# Patient Record
Sex: FEMALE | Race: BLACK OR AFRICAN AMERICAN | NOT HISPANIC OR LATINO | URBAN - METROPOLITAN AREA
[De-identification: names, ages, dates, MRNs, and addresses within clinical notes are randomized per-mention and may not be internally consistent; named-entity substitution may affect disease eponyms.]

---

## 2018-02-12 PROCEDURE — 88175 CYTOPATH C/V AUTO FLUID REDO: CPT | Performed by: OBSTETRICS & GYNECOLOGY

## 2018-02-12 PROCEDURE — 87624 HPV HI-RISK TYP POOLED RSLT: CPT | Performed by: OBSTETRICS & GYNECOLOGY

## 2018-06-12 PROCEDURE — 87086 URINE CULTURE/COLONY COUNT: CPT | Performed by: PHYSICIAN ASSISTANT

## 2018-08-02 NOTE — LETTER
06/17/20     To whom it may concern,     Ms. Ramos Huston was evaluated for her right leg wounds at [facility]. Part of her treatment plan is to wear compression wraps on her legs 24 hours a day/7 days a week.  She will be seen at our clinic [facility]
No

## 2018-10-04 ENCOUNTER — APPOINTMENT (RX ONLY)
Dept: URBAN - METROPOLITAN AREA CLINIC 368 | Facility: CLINIC | Age: 58
Setting detail: DERMATOLOGY
End: 2018-10-04

## 2018-10-04 DIAGNOSIS — L72.0 EPIDERMAL CYST: ICD-10-CM

## 2018-10-04 DIAGNOSIS — L98.8 OTHER SPECIFIED DISORDERS OF THE SKIN AND SUBCUTANEOUS TISSUE: ICD-10-CM

## 2018-10-04 DIAGNOSIS — L90.8 OTHER ATROPHIC DISORDERS OF SKIN: ICD-10-CM

## 2018-10-04 PROCEDURE — ? DEFER

## 2018-10-04 PROCEDURE — ? PRESCRIPTION

## 2018-10-04 PROCEDURE — ? TREATMENT REGIMEN

## 2018-10-04 PROCEDURE — 99202 OFFICE O/P NEW SF 15 MIN: CPT

## 2018-10-04 PROCEDURE — ? COUNSELING

## 2018-10-04 RX ORDER — LIDOCAINE AND PRILOCAINE 25; 25 MG/G; MG/G
CREAM TOPICAL
Qty: 1 | Refills: 1 | Status: ERX | COMMUNITY
Start: 2018-10-04

## 2018-10-04 RX ADMIN — LIDOCAINE AND PRILOCAINE: 25; 25 CREAM TOPICAL at 17:54

## 2018-10-04 NOTE — HPI: SKIN LESION
What Type Of Note Output Would You Prefer (Optional)?: Bullet Format
How Severe Is Your Skin Lesion?: moderate
Has Your Skin Lesion Been Treated?: not been treated
Is This A New Presentation, Or A Follow-Up?: Skin Lesions

## 2019-04-12 PROBLEM — I10 ESSENTIAL HYPERTENSION: Status: ACTIVE | Noted: 2019-04-12

## 2019-12-07 ENCOUNTER — APPOINTMENT (RX ONLY)
Dept: URBAN - METROPOLITAN AREA CLINIC 368 | Facility: CLINIC | Age: 59
Setting detail: DERMATOLOGY
End: 2019-12-07

## 2019-12-07 DIAGNOSIS — L98.8 OTHER SPECIFIED DISORDERS OF THE SKIN AND SUBCUTANEOUS TISSUE: ICD-10-CM

## 2019-12-07 PROCEDURE — ? FILLERS

## 2019-12-07 PROCEDURE — ? TREATMENT REGIMEN

## 2019-12-07 PROCEDURE — ? COUNSELING

## 2019-12-07 ASSESSMENT — LOCATION SIMPLE DESCRIPTION DERM
LOCATION SIMPLE: RIGHT CHEEK
LOCATION SIMPLE: LEFT CHEEK

## 2019-12-07 ASSESSMENT — LOCATION DETAILED DESCRIPTION DERM
LOCATION DETAILED: LEFT CENTRAL MALAR CHEEK
LOCATION DETAILED: RIGHT CENTRAL MALAR CHEEK

## 2019-12-07 ASSESSMENT — LOCATION ZONE DERM: LOCATION ZONE: FACE

## 2019-12-21 ENCOUNTER — APPOINTMENT (RX ONLY)
Dept: URBAN - METROPOLITAN AREA CLINIC 368 | Facility: CLINIC | Age: 59
Setting detail: DERMATOLOGY
End: 2019-12-21

## 2019-12-21 DIAGNOSIS — L98.8 OTHER SPECIFIED DISORDERS OF THE SKIN AND SUBCUTANEOUS TISSUE: ICD-10-CM

## 2019-12-21 PROCEDURE — ? FILLERS

## 2019-12-21 PROCEDURE — ? COUNSELING

## 2019-12-21 ASSESSMENT — LOCATION DETAILED DESCRIPTION DERM
LOCATION DETAILED: RIGHT NASOLABIAL FOLD
LOCATION DETAILED: LEFT NASOLABIAL FOLD
LOCATION DETAILED: LEFT MEDIAL BUCCAL CHEEK
LOCATION DETAILED: RIGHT MEDIAL BUCCAL CHEEK

## 2019-12-21 ASSESSMENT — LOCATION ZONE DERM
LOCATION ZONE: FACE
LOCATION ZONE: LIP

## 2019-12-21 ASSESSMENT — LOCATION SIMPLE DESCRIPTION DERM
LOCATION SIMPLE: LEFT CHEEK
LOCATION SIMPLE: RIGHT CHEEK
LOCATION SIMPLE: LEFT LIP
LOCATION SIMPLE: RIGHT LIP

## 2020-03-23 PROBLEM — I87.2 VENOUS STASIS DERMATITIS OF BOTH LOWER EXTREMITIES: Status: ACTIVE | Noted: 2020-03-23

## 2020-03-23 PROBLEM — I83.029 VENOUS ULCER OF LEFT LEG (HCC): Status: ACTIVE | Noted: 2020-03-23

## 2020-03-23 PROBLEM — L97.929 VENOUS ULCER OF LEFT LEG (HCC): Status: ACTIVE | Noted: 2020-03-23

## 2020-04-03 ENCOUNTER — APPOINTMENT (OUTPATIENT)
Dept: WOUND CARE | Facility: HOSPITAL | Age: 60
End: 2020-04-03
Attending: NURSE PRACTITIONER
Payer: COMMERCIAL

## 2020-04-06 ENCOUNTER — OFFICE VISIT (OUTPATIENT)
Dept: WOUND CARE | Facility: HOSPITAL | Age: 60
End: 2020-04-06
Attending: NURSE PRACTITIONER
Payer: COMMERCIAL

## 2020-04-06 DIAGNOSIS — L97.929 VENOUS ULCER OF LEFT LEG (HCC): Primary | ICD-10-CM

## 2020-04-06 DIAGNOSIS — I83.029 VENOUS ULCER OF LEFT LEG (HCC): Primary | ICD-10-CM

## 2020-04-06 PROCEDURE — 99214 OFFICE O/P EST MOD 30 MIN: CPT

## 2020-04-06 PROCEDURE — 29581 APPL MULTLAYER CMPRN SYS LEG: CPT

## 2020-04-06 NOTE — PROGRESS NOTES
Subjective    Chief Complaint  This information was obtained from the patient  The patient returns today for follow up and management of Venous Wound(s).  Venous ulcer of left leg (HCC)    Allergies  Augmentin, clindamycin, strawberries, Bactrim, PCN    HPI Constitutional Symptoms (General Health): Chills, Fatigue, Fever, Loss of Appetite, Unintentional Weight Loss  Eyes: Blurred Vision, Discharge/Drainage, Vision Changes  Ear/Nose/Mouth/Throat: Hearing Loss / Aid, Loss of Smell, Loss of Taste, Sore Throat  R Wound #2 Left, Lateral Lower Leg is a Partial Thickness Venous Ulcer and has received a status of Not Healed. Initial wound encounter measurements are 3.5cm length x 1.5cm width x 0.1cm depth, with an area of 5.25 sq cm and a volume of 0.525 cubic cm.  Ther Integumentary (Hair, Skin)  BLEs significant venous insufficiency and lipodermatosclerosis/scar tissue formation, with left lower leg wound. Neurological:  normal tone of muscle, 5/5 strength in all extremities . sensation grossly intact. Jackson Master     Psychiatri (Encounter Diagnosis) I83.029 - Varicose veins of left lower extremity with ulcer of unspecified site  (Encounter Diagnosis) L97.929 - Non-pressure chronic ulcer of unspecified part of left lower leg with unspecified severity  (Encounter Diagnosis) L03.116 Change dressing every: - week    Compression Therapy:  Avoid prolonged standing in one place. Elevate leg(s) as much as possible. Stockinette 4\"  Artiflex 10 cm  Artiflex 15 cm  Comprilan 8 cm. Comprilan 10 cm. Comprilan 12 cm.     Wound #2 Left, Later Status: Completed Date: 4/6/2020  - Fall Risk Assessment on admission. Repeat whenever changes in mobility occur or whenever a new offloading device is used for patients with leg/foot ulcers or per facility policy.   Status: Completed Date: 4/6/2020  - Chaz Assessed patient’s pain status and effectiveness of pain management plan. Limb cleansed using Betasept and water (1), Soap and water (1)  Cleansed wound and periwound with non-cytotoxic agent.   Applied topical product to daisy-wound area avoiding wound bas Extremity Location: Lower Left Extremity    Electronic Signature(s)  Signed By: Walker Diaz 04/06/2020 11:30:53 AM  Tracy BALLESTEROS 04/06/2020 2:06:50 PM       Entered By: Antonella Escobar on 04/06/2020 11:24:40 AM

## 2020-04-13 ENCOUNTER — OFFICE VISIT (OUTPATIENT)
Dept: WOUND CARE | Facility: HOSPITAL | Age: 60
End: 2020-04-13
Attending: NURSE PRACTITIONER
Payer: COMMERCIAL

## 2020-04-13 DIAGNOSIS — L97.929 VENOUS ULCER OF LEFT LEG (HCC): Primary | ICD-10-CM

## 2020-04-13 DIAGNOSIS — I83.029 VENOUS ULCER OF LEFT LEG (HCC): Primary | ICD-10-CM

## 2020-04-13 PROCEDURE — 99213 OFFICE O/P EST LOW 20 MIN: CPT

## 2020-04-13 NOTE — PROGRESS NOTES
Subjective    Chief Complaint  This information was obtained from the patient  The patient returns today for follow up and management of venous ulcer of left lower leg.     Allergies  Augmentin, clindamycin, strawberries, Bactrim, PCN    HPI  This informati Wound Assessment(s)  Wound #1 Left, Lateral, Anterior Lower Leg is a Venous Ulcer and has received an outcome of Resolved. Measurements are 0cm length x 0cm width, with an area of 0 sq cm . The periwound skin exhibited: Edema, Dry/Scaly.  The periwound ski Skin normal color, texture and turgor with no lesions or eruptions except left leg wound detail in the wound section. .    Neurological:  sensation grossly intact. Edwards Pencil Psychiatric:  Appropriate judgement and insight. Oriented to time, place and person.  Appr Erneston 12 cm. Misc / Additional orders  Supplement with a daily multivitamin. Increase dietary protein intake. Exercise as tolerated. Decrease salt intake. Moisturizer.   S/S of infection - monitor for S&S of soft tissue infection such as fever, c Compression used: using Artiflex 10 cm (1), Artiflex 15 cm (1), Comprilan 08 cm (1), Comprilan 10 cm (2), Comprilan 12 cm (1)

## 2020-04-20 ENCOUNTER — OFFICE VISIT (OUTPATIENT)
Dept: WOUND CARE | Facility: HOSPITAL | Age: 60
End: 2020-04-20
Attending: NURSE PRACTITIONER
Payer: COMMERCIAL

## 2020-04-20 ENCOUNTER — APPOINTMENT (OUTPATIENT)
Dept: WOUND CARE | Facility: HOSPITAL | Age: 60
End: 2020-04-20
Payer: COMMERCIAL

## 2020-04-20 DIAGNOSIS — L97.929 VENOUS ULCER OF LEFT LEG (HCC): Primary | ICD-10-CM

## 2020-04-20 DIAGNOSIS — I83.029 VENOUS ULCER OF LEFT LEG (HCC): Primary | ICD-10-CM

## 2020-04-20 PROCEDURE — 99213 OFFICE O/P EST LOW 20 MIN: CPT

## 2020-04-20 NOTE — PROGRESS NOTES
Subjective    Chief Complaint  This information was obtained from the patient  The patient returns today for follow up and management of venous ulcer of left lower leg.     Allergies  Augmentin, clindamycin, strawberries, Bactrim, PCN    HPI  This informati Exam  Constitutional  obese. well developed, no acute distress. Respiratory:  effortless breathing. Cardiovascular:  Palpable pedal pulses. BLE varicose veins, with hemosiderin staining, BLE's edema well managed by compression therapy .     Musculoske dependent position. Patient may take off the socks when in bed and re-apply compression soon after getting off the bed. Patient sleeps in chair often which may result in re-ulceration without compression stockings. Plan  Additional Orders:     Follow-Up A wound care clinic. Thank you for allowing me to participate in the care of this patient. Instructed patient to f/u with PCP for management of her medical problems. Please do not hesitate to call with concern or questions.     Electronic Signature(s)  Si

## 2020-04-27 ENCOUNTER — APPOINTMENT (OUTPATIENT)
Dept: WOUND CARE | Facility: HOSPITAL | Age: 60
End: 2020-04-27
Attending: NURSE PRACTITIONER
Payer: COMMERCIAL

## 2020-04-27 ENCOUNTER — APPOINTMENT (OUTPATIENT)
Dept: WOUND CARE | Facility: HOSPITAL | Age: 60
End: 2020-04-27
Payer: COMMERCIAL

## 2020-06-17 ENCOUNTER — OFFICE VISIT (OUTPATIENT)
Dept: WOUND CARE | Facility: HOSPITAL | Age: 60
End: 2020-06-17
Attending: NURSE PRACTITIONER
Payer: COMMERCIAL

## 2020-06-17 DIAGNOSIS — L97.919 STASIS EDEMA WITH ULCER, RIGHT (HCC): Primary | ICD-10-CM

## 2020-06-17 DIAGNOSIS — I87.311 STASIS EDEMA WITH ULCER, RIGHT (HCC): Primary | ICD-10-CM

## 2020-06-17 PROCEDURE — 29581 APPL MULTLAYER CMPRN SYS LEG: CPT

## 2020-06-17 PROCEDURE — 99214 OFFICE O/P EST MOD 30 MIN: CPT

## 2020-06-17 NOTE — PROGRESS NOTES
Subjective    Chief Complaint  This information was obtained from the patient  The patient is new to the [address] [facility],[address] here for an initial visit for the evaluation and management of non-healing right lower leg wound and swelling.     Allergies  Augmentin (Re Never smoker, Alcohol Use - no, Illicit Drug Use - no, Lives in - house, Lives with - self, No Signs/Symptoms of Abuse, Suicide Risk: Patient denies suicidal ideation    Medical History  This information was obtained from the patient  Patient has a medical Wound #3 Right, Posterior Lower Leg cellulitis is a Partial Thickness Venous Ulcer and has received a status of Not Healed.  Initial wound encounter measurements are 5cm length x 1.5cm width x 0.1cm depth, with an area of 7.5 sq cm and a volume of 0.75 cubi Positive bowel sounds. Soft, obese, nontender. No guarding or rebound. .    Lymphatic:  no palpable lymph nodes in neck. bilateral lower ext lymphedema with positive Stemmer sign.     Musculoskeletal:  BLE with no joint deformity, muscle strength 5/5, full Dependent Rubor: Yes  Hyperpigmentation: No  Lipodermatosclerosis: No          Assessment    Active Problems    ICD-10  (Encounter Diagnosis) I87.311 - Chronic venous hypertension (idiopathic) with ulcer of right lower extremity        Right leg wound:  -- Decrease salt intake. Moisturizer. S/S of infection - monitor for S&S of soft tissue infection such as fever, chills, erythema, increased drainage and foul smell. Plan of Care:  Nutrition:  - Determine patient protein needs based on body weight.   Stat Discussed the etiology of the chronic venous insufficiency wound and treatment and management of this wound. Discussed lifelong compression therapy as the main management of venous insufficiency and prevention of the ulcerations.   Discussed leg elevation Stockinette applied using 3 (1)  Compression applied to: using Toe bases to tibial tubercle (1)  Compression used: using Artiflex 10 cm (1), Artiflex 15 cm (1), Comprilan 08 cm (1), Comprilan 10 cm (1), Comprilan 12 cm (1)    Header Image    Multi Layer Co

## 2020-06-25 ENCOUNTER — OFFICE VISIT (OUTPATIENT)
Dept: WOUND CARE | Facility: HOSPITAL | Age: 60
End: 2020-06-25
Attending: NURSE PRACTITIONER
Payer: COMMERCIAL

## 2020-06-25 DIAGNOSIS — I83.029 VENOUS ULCER OF LEFT LEG (HCC): Primary | ICD-10-CM

## 2020-06-25 DIAGNOSIS — L97.929 VENOUS ULCER OF LEFT LEG (HCC): Primary | ICD-10-CM

## 2020-06-25 PROCEDURE — 99213 OFFICE O/P EST LOW 20 MIN: CPT

## 2020-06-25 NOTE — PROGRESS NOTES
Subjective    Chief Complaint  This information was obtained from the patient  The patient is new to the [address] [facility],[address] here for an initial visit for the evaluation and management of non-healing right lower leg wound and swelling.  6/25 - no additional delano Integumentary (Hair/Skin/Nails): Dryness, Calluses/Corns, Hemosiderin Staining, Hyperpigmentation, Prone to Skin Tears, Ulcers  Psychiatric: Anxiety    Patient denies complaints or symptoms related to:  Constitutional Symptoms (General Health): Chills, Fat BLE with no significant deformity or joint abnormality, lymphedema. Peripheral pulses intact. varicosities with hemosiderin staining . Bill Amy Integumentary (Hair, Skin)  No rash, no lesions, no erythema, no open wounds. no edema, no induration.     Psychiatric Status: Completed Date: 6/17/2020  - Vital signs on admission then per facilty policy. Status: Completed Date: 6/25/2020  - Record current medications and treatments.   Status: Completed Date: 6/17/2020  - Assess wound pain every visit, before and after pr Signed By: Date:  Ravinder Flores FNP-BC 06/25/2020 9:37:43 AM       Entered By: Ravinder Flores on 06/25/2020 9:37:21 AM  Treatment Notes Summary  Wound #3 (Right, Posterior Lower Leg)  . Wound Treatment Note  Assessed patient’s pain status and effectiveness o

## 2020-07-02 ENCOUNTER — APPOINTMENT (OUTPATIENT)
Dept: WOUND CARE | Facility: HOSPITAL | Age: 60
End: 2020-07-02
Payer: COMMERCIAL

## 2020-08-04 PROBLEM — I89.0 LYMPHEDEMA OF BOTH LOWER EXTREMITIES: Status: ACTIVE | Noted: 2020-08-04

## 2021-04-05 PROCEDURE — 88305 TISSUE EXAM BY PATHOLOGIST: CPT | Performed by: INTERNAL MEDICINE

## 2021-04-22 ENCOUNTER — APPOINTMENT (RX ONLY)
Dept: URBAN - METROPOLITAN AREA CLINIC 368 | Facility: CLINIC | Age: 61
Setting detail: DERMATOLOGY
End: 2021-04-22

## 2021-04-22 DIAGNOSIS — Z41.9 ENCOUNTER FOR PROCEDURE FOR PURPOSES OTHER THAN REMEDYING HEALTH STATE, UNSPECIFIED: ICD-10-CM

## 2021-04-22 PROCEDURE — ? RESTYLANE LYFT INJECTION

## 2021-04-22 PROCEDURE — ? BOTOX

## 2021-04-22 PROCEDURE — ? COSMETIC CONSULTATION - FACELIFT

## 2021-04-22 NOTE — PROCEDURE: RESTYLANE LYFT INJECTION
Nasolabial Folds Filler Volume In Cc: 0.5
Additional Area 3 Volume In Cc: 0
Additional Anesthesia Volume In Cc: 6
Use Map Statement For Sites (Optional): No
Procedural Text: The filler was administered to the treatment areas noted above.
Map Statement: See Attach Map for Complete Details
Post-Care Instructions: Patient instructed to apply ice to reduce swelling.
Filler: Laura Florence
Detail Level: Zone
Consent: Written consent obtained. Risks include but not limited to bruising, beading, irregular texture, ulceration, infection, allergic reaction, scar formation, incomplete augmentation, temporary nature, procedural pain.
Lot #: [id number]
Number Of Syringes (Required For Inventory): 1

## 2022-02-10 ENCOUNTER — APPOINTMENT (RX ONLY)
Dept: URBAN - METROPOLITAN AREA CLINIC 368 | Facility: CLINIC | Age: 62
Setting detail: DERMATOLOGY
End: 2022-02-10

## 2022-02-10 DIAGNOSIS — Z41.9 ENCOUNTER FOR PROCEDURE FOR PURPOSES OTHER THAN REMEDYING HEALTH STATE, UNSPECIFIED: ICD-10-CM

## 2022-02-10 PROCEDURE — ? BOTOX

## 2022-02-10 PROCEDURE — ? COSMETIC CONSULTATION: BOTULINUM TOXIN

## 2022-02-10 PROCEDURE — ? RESTYLANE LYFT INJECTION

## 2022-12-03 ENCOUNTER — APPOINTMENT (RX ONLY)
Dept: URBAN - METROPOLITAN AREA CLINIC 337 | Facility: CLINIC | Age: 62
Setting detail: DERMATOLOGY
End: 2022-12-03

## 2022-12-03 DIAGNOSIS — L98.8 OTHER SPECIFIED DISORDERS OF THE SKIN AND SUBCUTANEOUS TISSUE: ICD-10-CM

## 2022-12-03 DIAGNOSIS — Z41.9 ENCOUNTER FOR PROCEDURE FOR PURPOSES OTHER THAN REMEDYING HEALTH STATE, UNSPECIFIED: ICD-10-CM

## 2022-12-03 PROCEDURE — ? COUNSELING

## 2022-12-03 PROCEDURE — ? COSMETIC CONSULTATION: BOTULINUM TOXIN

## 2022-12-03 PROCEDURE — ? FILLERS

## 2022-12-03 PROCEDURE — ? BOTOX

## 2022-12-03 ASSESSMENT — LOCATION DETAILED DESCRIPTION DERM
LOCATION DETAILED: RIGHT INFERIOR LATERAL FOREHEAD
LOCATION DETAILED: LEFT INFERIOR CENTRAL MALAR CHEEK
LOCATION DETAILED: GLABELLA
LOCATION DETAILED: LEFT SUPERIOR CENTRAL MALAR CHEEK
LOCATION DETAILED: RIGHT SUPERIOR MEDIAL MALAR CHEEK
LOCATION DETAILED: LEFT FOREHEAD
LOCATION DETAILED: LEFT INFERIOR TEMPLE
LOCATION DETAILED: RIGHT INFERIOR CENTRAL MALAR CHEEK
LOCATION DETAILED: RIGHT INFERIOR TEMPLE

## 2022-12-03 ASSESSMENT — LOCATION SIMPLE DESCRIPTION DERM
LOCATION SIMPLE: LEFT CHEEK
LOCATION SIMPLE: RIGHT TEMPLE
LOCATION SIMPLE: RIGHT CHEEK
LOCATION SIMPLE: RIGHT FOREHEAD
LOCATION SIMPLE: LEFT FOREHEAD
LOCATION SIMPLE: GLABELLA
LOCATION SIMPLE: LEFT TEMPLE

## 2022-12-03 ASSESSMENT — LOCATION ZONE DERM: LOCATION ZONE: FACE

## 2023-03-16 ENCOUNTER — APPOINTMENT (RX ONLY)
Dept: URBAN - METROPOLITAN AREA CLINIC 337 | Facility: CLINIC | Age: 63
Setting detail: DERMATOLOGY
End: 2023-03-16

## 2023-03-16 DIAGNOSIS — L98.8 OTHER SPECIFIED DISORDERS OF THE SKIN AND SUBCUTANEOUS TISSUE: ICD-10-CM

## 2023-03-16 DIAGNOSIS — L30.9 DERMATITIS, UNSPECIFIED: ICD-10-CM | Status: INADEQUATELY CONTROLLED

## 2023-03-16 PROCEDURE — ? COUNSELING

## 2023-03-16 PROCEDURE — ? PRESCRIPTION

## 2023-03-16 PROCEDURE — ? BOTOX

## 2023-03-16 PROCEDURE — ? COSMETIC CONSULTATION: BLEPHAROPLASTY

## 2023-03-16 PROCEDURE — 99214 OFFICE O/P EST MOD 30 MIN: CPT

## 2023-03-16 RX ORDER — DESONIDE 0.5 MG/G
CREAM TOPICAL BID
Qty: 60 | Refills: 3 | Status: ERX | COMMUNITY
Start: 2023-03-16

## 2023-03-16 RX ADMIN — DESONIDE: 0.5 CREAM TOPICAL at 00:00

## 2023-03-16 ASSESSMENT — LOCATION ZONE DERM
LOCATION ZONE: EYELID
LOCATION ZONE: FACE

## 2023-03-16 ASSESSMENT — LOCATION DETAILED DESCRIPTION DERM
LOCATION DETAILED: LEFT LATERAL INFERIOR EYELID
LOCATION DETAILED: LEFT SUPERIOR CENTRAL MALAR CHEEK
LOCATION DETAILED: RIGHT LATERAL INFERIOR EYELID

## 2023-03-16 ASSESSMENT — LOCATION SIMPLE DESCRIPTION DERM
LOCATION SIMPLE: LEFT INFERIOR EYELID
LOCATION SIMPLE: RIGHT INFERIOR EYELID
LOCATION SIMPLE: LEFT CHEEK

## 2023-03-16 NOTE — HPI: RASH
Is This A New Presentation, Or A Follow-Up?: Rash
Additional History: Pt reports trying Peter Didier Mejia eye cream at the time of the rash appearing

## 2023-04-28 ENCOUNTER — APPOINTMENT (RX ONLY)
Dept: URBAN - METROPOLITAN AREA CLINIC 337 | Facility: CLINIC | Age: 63
Setting detail: DERMATOLOGY
End: 2023-04-28

## 2023-04-28 DIAGNOSIS — Z41.9 ENCOUNTER FOR PROCEDURE FOR PURPOSES OTHER THAN REMEDYING HEALTH STATE, UNSPECIFIED: ICD-10-CM

## 2023-04-28 PROCEDURE — ? PRESCRIPTION

## 2023-04-28 PROCEDURE — ? TREATMENT REGIMEN

## 2023-04-28 RX ORDER — NA MG FL/NA PHO/NACL/HA/NA HYP
GEL (GRAM) TOPICAL
Qty: 80 | Refills: 0 | Status: ERX | COMMUNITY
Start: 2023-04-28

## 2023-04-28 RX ORDER — CEPHALEXIN 500 MG/1
CAPSULE ORAL
Qty: 18 | Refills: 0 | Status: ERX | COMMUNITY
Start: 2023-04-28

## 2023-04-28 RX ORDER — DIAZEPAM 10 MG/1
TABLET ORAL
Qty: 2 | Refills: 0 | Status: ERX | COMMUNITY
Start: 2023-04-28

## 2023-04-28 RX ORDER — TOBRAMYCIN AND DEXAMETHASONE 3; 1 MG/G; MG/G
OINTMENT OPHTHALMIC
Qty: 3.5 | Refills: 0 | Status: ERX | COMMUNITY
Start: 2023-04-28

## 2023-04-28 RX ORDER — HYDROCODONE BITARTRATE AND ACETAMINOPHEN 7.5; 3 MG/1; MG/1
TABLET ORAL
Qty: 6 | Refills: 0 | Status: ERX | COMMUNITY
Start: 2023-04-28

## 2023-04-28 RX ORDER — METHYLPREDNISOLONE 4 MG/1
TABLET ORAL
Qty: 1 | Refills: 0 | Status: ERX | COMMUNITY
Start: 2023-04-28

## 2023-04-28 RX ADMIN — CEPHALEXIN: 500 CAPSULE ORAL at 00:00

## 2023-04-28 RX ADMIN — DIAZEPAM: 10 TABLET ORAL at 00:00

## 2023-04-28 RX ADMIN — METHYLPREDNISOLONE: 4 TABLET ORAL at 00:00

## 2023-04-28 RX ADMIN — Medication: at 00:00

## 2023-04-28 RX ADMIN — TOBRAMYCIN AND DEXAMETHASONE: 3; 1 OINTMENT OPHTHALMIC at 00:00

## 2023-04-28 RX ADMIN — HYDROCODONE BITARTRATE AND ACETAMINOPHEN: 7.5; 3 TABLET ORAL at 00:00

## 2023-04-28 NOTE — PROCEDURE: TREATMENT REGIMEN
Detail Level: Zone
Plan: Bring all prescriptions to the appointment\\n\\nWash area with Acuicyn eye wash the day of then once daily\\nApply Tobradex ointment to the areas twice daily\\n\\nOrally:\\n-Take 1 tablet of hydrocodone once roomed by medical assistant for procedure then 1 tablet every 4-6 hours as needed for pain\\n-Take one tablet of valium the night prior to procedure. Bring second tablet with you to procedure and we will administer it prior to start.\\n-Take 4 tabs of kelfex once roomed by medical assistant then take one tab twice daily x 7 days\\n- Bring medrol dose chanda with you to procedure\\n\\nAvoid caffeine for 24 hours prior to procedure

## 2023-05-01 RX ORDER — TOBRAMYCIN AND DEXAMETHASONE 3; 1 MG/G; MG/G
OINTMENT OPHTHALMIC
Qty: 3.5 | Refills: 0 | Status: ERX

## 2023-05-04 ENCOUNTER — APPOINTMENT (RX ONLY)
Dept: URBAN - METROPOLITAN AREA CLINIC 337 | Facility: CLINIC | Age: 63
Setting detail: DERMATOLOGY
End: 2023-05-04

## 2023-05-04 DIAGNOSIS — Z41.9 ENCOUNTER FOR PROCEDURE FOR PURPOSES OTHER THAN REMEDYING HEALTH STATE, UNSPECIFIED: ICD-10-CM

## 2023-05-04 PROCEDURE — ? LASER RESURFACING

## 2023-05-04 PROCEDURE — ? BLEPHAROPLASTY

## 2023-05-04 ASSESSMENT — LOCATION SIMPLE DESCRIPTION DERM
LOCATION SIMPLE: RIGHT CHEEK
LOCATION SIMPLE: LEFT CHEEK

## 2023-05-04 ASSESSMENT — LOCATION ZONE DERM: LOCATION ZONE: FACE

## 2023-05-04 ASSESSMENT — LOCATION DETAILED DESCRIPTION DERM
LOCATION DETAILED: RIGHT SUPERIOR CENTRAL MALAR CHEEK
LOCATION DETAILED: LEFT SUPERIOR MEDIAL MALAR CHEEK

## 2023-05-04 NOTE — PROCEDURE: BLEPHAROPLASTY
Detail Level: Simple
Estimated Blood Loss (Cc): minimal
Intro: The patient was prepped with 0.01% hypochlorous acid in dilute saline solution on the skin, and a few drops of tetracaine solution were placed in the interior fornix. A drape was placed over the eyes in the usual sterile fashion.
Upper Eyelid Blepharoplasty: A 15 blade was used to make a skin incision along the elliptical demarcation. Scissors were used to excise the skin and underlying orbicularis muscle.  All active bleeding points were meticulously controlled with electrocautery.
Skin Closure Sutures: 6-0 Prolene
Lower Eyelid Blepharoplasty I: A 4-0 silk traction suture was placed through the lower lid margins and traction was applied superiorly. A 15 blade was used to make an infraciliary incision across the length of the eyelid extending 1 cm beyond the latereal commissure. A skin muscle flap was dissected using the Bovie in the cutting mode down to the level of the interior orbital rim. The nasal, central, and temporal fat compartments were exposed and these were also trimmed back to the level of the inferior orbital rim with a conservative fat resection removal to debulk the herniation of the fat compartment. Minimal resection of the nasal fat pad performed with the predominance inolving the temporal fat pad. Meticulous hemostasis was maintained.
Canthotomy: A lateral canthotomy and cantholyis of the inferior clarita was performed. A lateral canthal tendon was created in the usual standard manner and secured to the lateral orbital rim periosteum with 2 interrupted 5-0 Prolene sutures. The lateral commissure was reestablished joining the upper and lower eyelids at the gray line with interrupted 7-0 vicryl suture with the knot buried in the soft tissues.
Lower Eyelid Blepharoplasty Ii: An overlappping technique was then utilized to dermine the amount of redundant skin and muscle to be excised from lower eyelids. This determination revealed a 3mm amount of vertical skin to be excised which was performed across the length of the skin flap.
Skin Closure: simple interrupted
Temporary Frost: The previously placed traction sutures was now secured in place to the forehead with Steri-Strips and Mastisol solution as a temporary Frost suture.
Wound Care Applied To Incision Site: Petrolatum
Consent: The risks, benefits and alternatives of blepharoplasty were discussed with the patient. The patient read and signed the consent form, was identified, and was seated in the exam chair. In the preoperative area with the patient positioned upright on the stretcher, a marker pen was used to delineate the natural lid creases in the affected lids, and the amount of redundant skin and muscle to be removed utilizing a pinch technique. An intravenous line was established and cardiac monitors were placed.
Post-Care Instructions: An ice compress was applied over the eyes. At the conclusion of the procedure, the patient left the operating room in good condition.   The patient was advised to call immediately for any pain, lid swelling or tenderness, discharge, or loss of vision. The patient will return in several days for a postoperative exam.

## 2023-05-04 NOTE — PROCEDURE: LASER RESURFACING
Post-Care Instructions: I reviewed with the patient in detail post-care instructions. Patient should avoid sun until area fully healed. Patient will use post-procedure skin care kit.
Percent Coverage Per Pass (%): 100
Anesthesia Type: 1% lidocaine with epinephrine
Number Of Passes: 4
Anesthesia Volume In Cc: 3
Power (Robles): 30
Detail Level: Detailed
Wavelength: 10,600nm
Treatment Number: 1
External Cooling Fan Speed: 5
External Cooling: Dhara Cryo 6
Treatment Level (Optional): 120um/300um spt size 12mm
Consent: Written consent obtained, risks reviewed including but not limited to crusting, scabbing, blistering, scarring, darker or lighter pigmentary change, incomplete improvement of dyschromia, wrinkles, prolonged erythema and facial swelling, infection and bleeding.
Corneal Shield Text: A corneal shield was inserted after appropriate application of topical anesthesia.
Laser Type: eCO2
Was A Corneal Shield Used?: No

## 2023-05-11 ENCOUNTER — APPOINTMENT (RX ONLY)
Dept: URBAN - METROPOLITAN AREA CLINIC 337 | Facility: CLINIC | Age: 63
Setting detail: DERMATOLOGY
End: 2023-05-11

## 2023-05-11 ENCOUNTER — RX ONLY (OUTPATIENT)
Age: 63
Setting detail: RX ONLY
End: 2023-05-11

## 2023-05-11 DIAGNOSIS — Z41.9 ENCOUNTER FOR PROCEDURE FOR PURPOSES OTHER THAN REMEDYING HEALTH STATE, UNSPECIFIED: ICD-10-CM

## 2023-05-11 PROCEDURE — ? COSMETIC FOLLOW-UP

## 2023-05-11 PROCEDURE — ? SUTURE REMOVAL (GLOBAL PERIOD)

## 2023-05-11 RX ORDER — METHYLPREDNISOLONE 4 MG/1
TABLET ORAL
Qty: 1 | Refills: 0 | Status: ERX | COMMUNITY
Start: 2023-05-11

## 2023-05-11 RX ORDER — HYDROQUINONE 4 %
CREAM (GRAM) TOPICAL
Qty: 30 | Refills: 3 | Status: ERX | COMMUNITY
Start: 2023-05-11

## 2023-05-11 ASSESSMENT — LOCATION SIMPLE DESCRIPTION DERM
LOCATION SIMPLE: LEFT CHEEK
LOCATION SIMPLE: RIGHT INFERIOR EYELID
LOCATION SIMPLE: RIGHT CHEEK
LOCATION SIMPLE: LEFT INFERIOR EYELID

## 2023-05-11 ASSESSMENT — LOCATION ZONE DERM
LOCATION ZONE: FACE
LOCATION ZONE: EYELID

## 2023-05-11 ASSESSMENT — LOCATION DETAILED DESCRIPTION DERM
LOCATION DETAILED: LEFT LATERAL INFERIOR EYELID
LOCATION DETAILED: LEFT CENTRAL MALAR CHEEK
LOCATION DETAILED: RIGHT LATERAL INFERIOR EYELID
LOCATION DETAILED: RIGHT SUPERIOR CENTRAL MALAR CHEEK

## 2023-05-11 NOTE — PROCEDURE: SUTURE REMOVAL (GLOBAL PERIOD)
Detail Level: Zone
Add 43595 Cpt? (Important Note: In 2017 The Use Of 76881 Is Being Tracked By Cms To Determine Future Global Period Reimbursement For Global Periods): no

## 2023-06-15 ENCOUNTER — APPOINTMENT (RX ONLY)
Dept: URBAN - METROPOLITAN AREA CLINIC 337 | Facility: CLINIC | Age: 63
Setting detail: DERMATOLOGY
End: 2023-06-15

## 2023-06-15 DIAGNOSIS — L98.8 OTHER SPECIFIED DISORDERS OF THE SKIN AND SUBCUTANEOUS TISSUE: ICD-10-CM

## 2023-06-15 PROCEDURE — ? DYSPORT

## 2023-06-15 PROCEDURE — ? COUNSELING

## 2023-06-15 ASSESSMENT — LOCATION DETAILED DESCRIPTION DERM
LOCATION DETAILED: LEFT INFERIOR TEMPLE
LOCATION DETAILED: RIGHT LATERAL CANTHUS

## 2023-06-15 ASSESSMENT — LOCATION ZONE DERM
LOCATION ZONE: EYELID
LOCATION ZONE: FACE

## 2023-06-15 ASSESSMENT — LOCATION SIMPLE DESCRIPTION DERM
LOCATION SIMPLE: LEFT TEMPLE
LOCATION SIMPLE: RIGHT EYELID

## 2023-07-08 ENCOUNTER — APPOINTMENT (RX ONLY)
Dept: URBAN - METROPOLITAN AREA CLINIC 337 | Facility: CLINIC | Age: 63
Setting detail: DERMATOLOGY
End: 2023-07-08

## 2023-07-08 DIAGNOSIS — Z41.9 ENCOUNTER FOR PROCEDURE FOR PURPOSES OTHER THAN REMEDYING HEALTH STATE, UNSPECIFIED: ICD-10-CM

## 2023-07-08 PROCEDURE — ? BOTOX

## 2023-07-08 ASSESSMENT — LOCATION SIMPLE DESCRIPTION DERM
LOCATION SIMPLE: LEFT FOREHEAD
LOCATION SIMPLE: RIGHT FOREHEAD
LOCATION SIMPLE: GLABELLA

## 2023-07-08 ASSESSMENT — LOCATION DETAILED DESCRIPTION DERM
LOCATION DETAILED: GLABELLA
LOCATION DETAILED: RIGHT INFERIOR FOREHEAD
LOCATION DETAILED: LEFT FOREHEAD

## 2023-07-08 ASSESSMENT — LOCATION ZONE DERM: LOCATION ZONE: FACE

## 2023-10-05 ENCOUNTER — APPOINTMENT (RX ONLY)
Dept: URBAN - METROPOLITAN AREA CLINIC 337 | Facility: CLINIC | Age: 63
Setting detail: DERMATOLOGY
End: 2023-10-05

## 2023-10-05 DIAGNOSIS — L30.9 DERMATITIS, UNSPECIFIED: ICD-10-CM | Status: INADEQUATELY CONTROLLED

## 2023-10-05 PROCEDURE — ? PRESCRIPTION

## 2023-10-05 PROCEDURE — ? COUNSELING

## 2023-10-05 PROCEDURE — 99213 OFFICE O/P EST LOW 20 MIN: CPT

## 2023-10-05 PROCEDURE — ? PRESCRIPTION MEDICATION MANAGEMENT

## 2023-10-05 RX ORDER — MOMETASONE FUROATE 1 MG/G
CREAM TOPICAL
Qty: 45 | Refills: 2 | Status: ERX | COMMUNITY
Start: 2023-10-05

## 2023-10-05 RX ADMIN — MOMETASONE FUROATE: 1 CREAM TOPICAL at 00:00

## 2023-10-05 ASSESSMENT — LOCATION SIMPLE DESCRIPTION DERM
LOCATION SIMPLE: LEFT INFERIOR EYELID
LOCATION SIMPLE: RIGHT SUPERIOR EYELID

## 2023-10-05 ASSESSMENT — LOCATION ZONE DERM: LOCATION ZONE: EYELID

## 2023-10-05 ASSESSMENT — LOCATION DETAILED DESCRIPTION DERM
LOCATION DETAILED: RIGHT LATERAL SUPERIOR EYELID
LOCATION DETAILED: LEFT LATERAL INFERIOR EYELID

## 2023-10-05 NOTE — PROCEDURE: PRESCRIPTION MEDICATION MANAGEMENT
Render In Strict Bullet Format?: No
Detail Level: Zone
Plan: Pause use of anti aging creams until resolved.
Initiate Treatment: Apply mometasone 2x a day for 4 weeks to affected areas

## 2024-02-29 ENCOUNTER — APPOINTMENT (RX ONLY)
Dept: URBAN - METROPOLITAN AREA CLINIC 337 | Facility: CLINIC | Age: 64
Setting detail: DERMATOLOGY
End: 2024-02-29

## 2024-02-29 DIAGNOSIS — L98.8 OTHER SPECIFIED DISORDERS OF THE SKIN AND SUBCUTANEOUS TISSUE: ICD-10-CM

## 2024-02-29 PROCEDURE — ? FILLERS

## 2024-02-29 PROCEDURE — ? BOTOX

## 2024-02-29 PROCEDURE — ? COUNSELING

## 2024-02-29 ASSESSMENT — LOCATION SIMPLE DESCRIPTION DERM
LOCATION SIMPLE: RIGHT INFERIOR EYELID
LOCATION SIMPLE: LEFT CHEEK
LOCATION SIMPLE: RIGHT CHEEK

## 2024-02-29 ASSESSMENT — LOCATION ZONE DERM
LOCATION ZONE: FACE
LOCATION ZONE: EYELID

## 2024-02-29 ASSESSMENT — LOCATION DETAILED DESCRIPTION DERM
LOCATION DETAILED: RIGHT SUPERIOR MEDIAL BUCCAL CHEEK
LOCATION DETAILED: RIGHT CENTRAL MALAR CHEEK
LOCATION DETAILED: LEFT SUPERIOR CENTRAL BUCCAL CHEEK
LOCATION DETAILED: LEFT CENTRAL MALAR CHEEK
LOCATION DETAILED: RIGHT LATERAL INFERIOR EYELID

## 2024-02-29 NOTE — PROCEDURE: BOTOX
Inferior Lateral Orbicularis Oculi Units: 0
Show Topical Anesthesia: Yes
Additional Area 1 Location: vermillion border
Lot #: [id number]
Dilution (U/0.1 Cc): 4
Show Lcl Units: No
Reconstitution Date (Optional): 02/028/2024
Periorbital Skin Units: 12
Incrementing Botox Units: By 0.1 Units
Forehead Units: 8
Detail Level: Simple
Consent: Written consent obtained. Risks include but not limited to lid/brow ptosis, bruising, swelling, diplopia, temporary effect, incomplete chemical denervation.
Post-Care Instructions: Patient instructed to not lie down for 4 hours and limit physical activity for 24 hours.

## 2024-02-29 NOTE — PROCEDURE: FILLERS
Additional Area 5 Volume In Cc: 0
Include Cannula Length?: 1.5 inch
Consent: Written consent obtained. Risks include but not limited to bruising, beading, irregular texture, ulceration, infection, allergic reaction, scar formation, incomplete augmentation, temporary nature, and procedural pain.
Include Cannula Information In Note?: No
Post-Care Instructions: After the procedure, patient instructed to apply ice to reduce swelling.
Include Cannula Size?: 27G
Lot #: [id number]
Filler: RHA Redensity
Detail Level: Detailed
Expiration Date (Month Year): 12/6/2025
Filler: RHA 2
Include Cannula Brand?: DermaSculpt
Aspiration Statement: Aspiration was performed prior to injecting site with filler.
Lot #: [id number]
Map Statment: See Attach Map for Complete Details
Include Cannula Size?: 25G
Include Documentation That Aspiration Was Performed Prior To Injecting Filler:: Yes
Expiration Date (Month Year): 2024-03-31
Marionette Lines Filler Volume In Cc: 1

## 2024-06-27 ENCOUNTER — APPOINTMENT (RX ONLY)
Dept: URBAN - METROPOLITAN AREA CLINIC 337 | Facility: CLINIC | Age: 64
Setting detail: DERMATOLOGY
End: 2024-06-27

## 2024-06-27 DIAGNOSIS — L98.8 OTHER SPECIFIED DISORDERS OF THE SKIN AND SUBCUTANEOUS TISSUE: ICD-10-CM

## 2024-06-27 PROCEDURE — ? BOTOX

## 2024-06-27 PROCEDURE — ? COUNSELING

## 2024-06-27 ASSESSMENT — LOCATION SIMPLE DESCRIPTION DERM: LOCATION SIMPLE: RIGHT INFERIOR EYELID

## 2024-06-27 ASSESSMENT — LOCATION DETAILED DESCRIPTION DERM: LOCATION DETAILED: RIGHT LATERAL INFERIOR EYELID

## 2024-06-27 ASSESSMENT — LOCATION ZONE DERM: LOCATION ZONE: EYELID

## 2024-06-27 NOTE — PROCEDURE: BOTOX
Inferior Lateral Orbicularis Oculi Units: 0
Show Topical Anesthesia: Yes
Additional Area 1 Location: vermillion border
Lot #: [id number]
Dilution (U/0.1 Cc): 4
Show Lcl Units: No
Reconstitution Date (Optional): 6/27/2024
Periorbital Skin Units: 12
Incrementing Botox Units: By 0.1 Units
Expiration Date (Month Year): 10/2025
Forehead Units: 8
Detail Level: Zone
Consent: Written consent obtained. Risks include but not limited to lid/brow ptosis, bruising, swelling, diplopia, temporary effect, incomplete chemical denervation.
Post-Care Instructions: Patient instructed to not lie down for 4 hours and limit physical activity for 24 hours.

## 2024-11-21 ENCOUNTER — APPOINTMENT (RX ONLY)
Dept: URBAN - METROPOLITAN AREA CLINIC 337 | Facility: CLINIC | Age: 64
Setting detail: DERMATOLOGY
End: 2024-11-21

## 2024-11-21 DIAGNOSIS — L98.8 OTHER SPECIFIED DISORDERS OF THE SKIN AND SUBCUTANEOUS TISSUE: ICD-10-CM

## 2024-11-21 PROCEDURE — ? COUNSELING

## 2024-11-21 PROCEDURE — ? BOTOX

## 2024-11-21 ASSESSMENT — LOCATION ZONE DERM: LOCATION ZONE: EYELID

## 2024-11-21 ASSESSMENT — LOCATION DETAILED DESCRIPTION DERM: LOCATION DETAILED: RIGHT LATERAL INFERIOR EYELID

## 2024-11-21 ASSESSMENT — LOCATION SIMPLE DESCRIPTION DERM: LOCATION SIMPLE: RIGHT INFERIOR EYELID

## 2024-11-21 NOTE — PROCEDURE: BOTOX
Inferior Lateral Orbicularis Oculi Units: 0
Show Topical Anesthesia: Yes
Additional Area 1 Location: vermillion border
Lot #: [id number]
Dilution (U/0.1 Cc): 4
Show Lcl Units: No
Reconstitution Date (Optional): 11/21/2026
Periorbital Skin Units: 16
Incrementing Botox Units: By 0.1 Units
Expiration Date (Month Year): 10/2026
Forehead Units: 8
Detail Level: Zone
Consent: Written consent obtained. Risks include but not limited to lid/brow ptosis, bruising, swelling, diplopia, temporary effect, incomplete chemical denervation.
Glabellar Complex Units: 12
Post-Care Instructions: Patient instructed to not lie down for 4 hours and limit physical activity for 24 hours.

## 2025-03-20 ENCOUNTER — APPOINTMENT (OUTPATIENT)
Dept: URBAN - METROPOLITAN AREA CLINIC 337 | Facility: CLINIC | Age: 65
Setting detail: DERMATOLOGY
End: 2025-03-20

## 2025-03-20 DIAGNOSIS — L98.8 OTHER SPECIFIED DISORDERS OF THE SKIN AND SUBCUTANEOUS TISSUE: ICD-10-CM

## 2025-03-20 PROCEDURE — ? BOTOX

## 2025-03-20 PROCEDURE — ? COUNSELING

## 2025-03-20 PROCEDURE — ? PRESCRIPTION

## 2025-03-20 RX ORDER — LIDOCAINE/TETRACAINE 23 %-7 %
CREAM (GRAM) TOPICAL
Qty: 30 | Refills: 2 | Status: ERX | COMMUNITY
Start: 2025-03-20

## 2025-03-20 RX ADMIN — Medication: at 00:00

## 2025-03-20 ASSESSMENT — LOCATION DETAILED DESCRIPTION DERM
LOCATION DETAILED: LEFT INFERIOR LATERAL FOREHEAD
LOCATION DETAILED: GLABELLA
LOCATION DETAILED: RIGHT INFERIOR TEMPLE
LOCATION DETAILED: LEFT MEDIAL FOREHEAD
LOCATION DETAILED: RIGHT INFERIOR LATERAL FOREHEAD
LOCATION DETAILED: LEFT INFERIOR TEMPLE

## 2025-03-20 ASSESSMENT — LOCATION SIMPLE DESCRIPTION DERM
LOCATION SIMPLE: GLABELLA
LOCATION SIMPLE: LEFT TEMPLE
LOCATION SIMPLE: LEFT FOREHEAD
LOCATION SIMPLE: RIGHT FOREHEAD
LOCATION SIMPLE: RIGHT TEMPLE

## 2025-03-20 ASSESSMENT — LOCATION ZONE DERM: LOCATION ZONE: FACE

## 2025-03-20 NOTE — PROCEDURE: BOTOX
Inferior Lateral Orbicularis Oculi Units: 0
Show Topical Anesthesia: Yes
Additional Area 1 Location: vermillion border
Lot #: [id number]
Dilution (U/0.1 Cc): 4
Show Lcl Units: No
Reconstitution Date (Optional): 03/20/2025
Periorbital Skin Units: 12
Incrementing Botox Units: By 0.1 Units
Expiration Date (Month Year): 2026/12
Forehead Units: 16
Detail Level: Simple
Consent: Written consent obtained. Risks include but not limited to lid/brow ptosis, bruising, swelling, diplopia, temporary effect, incomplete chemical denervation.
Post-Care Instructions: Patient instructed to not lie down for 4 hours and limit physical activity for 24 hours.

## 2025-04-17 NOTE — PROCEDURE: COSMETIC FOLLOW-UP
Global Improvement: Good
Treatment (Optional): Laser Resurfacing
Side Effects Or Complications: None
Patient Satisfaction: Pleased
Detail Level: Zone
Treatment (Optional): Blepharoplasty
No.

## 2025-05-29 ENCOUNTER — APPOINTMENT (OUTPATIENT)
Dept: URBAN - METROPOLITAN AREA CLINIC 337 | Facility: CLINIC | Age: 65
Setting detail: DERMATOLOGY
End: 2025-05-29

## 2025-05-29 ENCOUNTER — RX ONLY (RX ONLY)
Age: 65
End: 2025-05-29

## 2025-05-29 DIAGNOSIS — Z41.9 ENCOUNTER FOR PROCEDURE FOR PURPOSES OTHER THAN REMEDYING HEALTH STATE, UNSPECIFIED: ICD-10-CM

## 2025-05-29 PROCEDURE — ? COSMETIC FOLLOW-UP

## 2025-05-29 RX ORDER — HYDROQUINONE 4 %
CREAM (GRAM) TOPICAL
Qty: 30 | Refills: 3 | Status: ERX

## 2025-05-29 RX ORDER — METHYLPREDNISOLONE 4 MG/1
TABLET ORAL
Qty: 1 | Refills: 1 | Status: ERX

## 2025-05-29 ASSESSMENT — LOCATION SIMPLE DESCRIPTION DERM
LOCATION SIMPLE: RIGHT CHEEK
LOCATION SIMPLE: LEFT CHEEK

## 2025-05-29 ASSESSMENT — LOCATION DETAILED DESCRIPTION DERM
LOCATION DETAILED: LEFT SUPERIOR CENTRAL MALAR CHEEK
LOCATION DETAILED: RIGHT SUPERIOR CENTRAL MALAR CHEEK

## 2025-05-29 ASSESSMENT — LOCATION ZONE DERM: LOCATION ZONE: FACE

## 2025-05-29 NOTE — PROCEDURE: COSMETIC FOLLOW-UP
Patient Satisfaction: Pleased
Treatment (Optional): Laser Resurfacing
Global Improvement: Very Good
Comments (Free Text): continue cicalfate each evening\\n\\nstart compound each evening\\n\\nsend medrol dose pack for residual edema
Detail Level: Zone

## 2025-07-10 ENCOUNTER — APPOINTMENT (OUTPATIENT)
Dept: URBAN - METROPOLITAN AREA CLINIC 337 | Facility: CLINIC | Age: 65
Setting detail: DERMATOLOGY
End: 2025-07-10

## 2025-07-10 DIAGNOSIS — Z41.9 ENCOUNTER FOR PROCEDURE FOR PURPOSES OTHER THAN REMEDYING HEALTH STATE, UNSPECIFIED: ICD-10-CM

## 2025-07-10 DIAGNOSIS — L98.8 OTHER SPECIFIED DISORDERS OF THE SKIN AND SUBCUTANEOUS TISSUE: ICD-10-CM

## 2025-07-10 PROCEDURE — ? COSMETIC FOLLOW-UP

## 2025-07-10 PROCEDURE — ? COUNSELING

## 2025-07-10 PROCEDURE — ? BOTOX

## 2025-07-10 ASSESSMENT — LOCATION DETAILED DESCRIPTION DERM
LOCATION DETAILED: LEFT INFERIOR TEMPLE
LOCATION DETAILED: RIGHT SUPERIOR CENTRAL MALAR CHEEK
LOCATION DETAILED: RIGHT INFERIOR LATERAL FOREHEAD
LOCATION DETAILED: GLABELLA
LOCATION DETAILED: LEFT INFERIOR LATERAL FOREHEAD
LOCATION DETAILED: LEFT SUPERIOR CENTRAL MALAR CHEEK
LOCATION DETAILED: LEFT MEDIAL FOREHEAD
LOCATION DETAILED: RIGHT INFERIOR TEMPLE

## 2025-07-10 ASSESSMENT — LOCATION ZONE DERM: LOCATION ZONE: FACE

## 2025-07-10 ASSESSMENT — LOCATION SIMPLE DESCRIPTION DERM
LOCATION SIMPLE: LEFT TEMPLE
LOCATION SIMPLE: LEFT CHEEK
LOCATION SIMPLE: RIGHT CHEEK
LOCATION SIMPLE: LEFT FOREHEAD
LOCATION SIMPLE: RIGHT TEMPLE
LOCATION SIMPLE: GLABELLA
LOCATION SIMPLE: RIGHT FOREHEAD

## 2025-07-10 NOTE — PROCEDURE: COSMETIC FOLLOW-UP
Patient Satisfaction: Pleased
Treatment (Optional): Laser Resurfacing
Global Improvement: Very Good
Comments (Free Text): continue cicalfate each evening\\n\\nstart compound each evening
Detail Level: Zone

## 2025-07-10 NOTE — PROCEDURE: BOTOX
Inferior Lateral Orbicularis Oculi Units: 0
Show Topical Anesthesia: Yes
Additional Area 1 Location: vermillion border
Lot #: [id number]
Dilution (U/0.1 Cc): 4
Show Lcl Units: No
Reconstitution Date (Optional): 03/20/2025
Periorbital Skin Units: 12
Incrementing Botox Units: By 0.1 Units
Expiration Date (Month Year): 2026/12
Forehead Units: 16
Detail Level: Simple
Consent: Written consent obtained. Risks include but not limited to lid/brow ptosis, bruising, swelling, diplopia, temporary effect, incomplete chemical denervation.
Post-Care Instructions: Patient instructed to not lie down for 4 hours and limit physical activity for 24 hours.

## (undated) NOTE — LETTER
04/06/20    To whom it may concern,    Ms. Andrew Benavidez was evaluated for her leg wounds at [place] [facility]. Part of her treatment plan is to wear compression wraps on her legs 24/7. She will be seen at our clinic weekly.  This compression wraps is n